# Patient Record
Sex: MALE | Race: WHITE | NOT HISPANIC OR LATINO
[De-identification: names, ages, dates, MRNs, and addresses within clinical notes are randomized per-mention and may not be internally consistent; named-entity substitution may affect disease eponyms.]

---

## 2018-10-12 ENCOUNTER — TRANSCRIPTION ENCOUNTER (OUTPATIENT)
Age: 23
End: 2018-10-12

## 2020-06-28 ENCOUNTER — TRANSCRIPTION ENCOUNTER (OUTPATIENT)
Age: 25
End: 2020-06-28

## 2020-07-05 ENCOUNTER — TRANSCRIPTION ENCOUNTER (OUTPATIENT)
Age: 25
End: 2020-07-05

## 2020-10-29 ENCOUNTER — TRANSCRIPTION ENCOUNTER (OUTPATIENT)
Age: 25
End: 2020-10-29

## 2021-01-18 ENCOUNTER — TRANSCRIPTION ENCOUNTER (OUTPATIENT)
Age: 26
End: 2021-01-18

## 2021-02-10 ENCOUNTER — TRANSCRIPTION ENCOUNTER (OUTPATIENT)
Age: 26
End: 2021-02-10

## 2021-05-17 ENCOUNTER — TRANSCRIPTION ENCOUNTER (OUTPATIENT)
Age: 26
End: 2021-05-17

## 2021-05-21 ENCOUNTER — TRANSCRIPTION ENCOUNTER (OUTPATIENT)
Age: 26
End: 2021-05-21

## 2021-10-05 ENCOUNTER — TRANSCRIPTION ENCOUNTER (OUTPATIENT)
Age: 26
End: 2021-10-05

## 2022-02-03 ENCOUNTER — TRANSCRIPTION ENCOUNTER (OUTPATIENT)
Age: 27
End: 2022-02-03

## 2022-02-16 ENCOUNTER — TRANSCRIPTION ENCOUNTER (OUTPATIENT)
Age: 27
End: 2022-02-16

## 2022-02-24 ENCOUNTER — APPOINTMENT (OUTPATIENT)
Dept: ORTHOPEDIC SURGERY | Facility: CLINIC | Age: 27
End: 2022-02-24
Payer: MEDICAID

## 2022-02-24 ENCOUNTER — NON-APPOINTMENT (OUTPATIENT)
Age: 27
End: 2022-02-24

## 2022-02-24 VITALS
SYSTOLIC BLOOD PRESSURE: 125 MMHG | DIASTOLIC BLOOD PRESSURE: 81 MMHG | HEIGHT: 73 IN | BODY MASS INDEX: 28.49 KG/M2 | WEIGHT: 215 LBS | HEART RATE: 79 BPM

## 2022-02-24 DIAGNOSIS — M76.52 PATELLAR TENDINITIS, LEFT KNEE: ICD-10-CM

## 2022-02-24 PROCEDURE — 99202 OFFICE O/P NEW SF 15 MIN: CPT

## 2022-02-25 DIAGNOSIS — Z82.3 FAMILY HISTORY OF STROKE: ICD-10-CM

## 2022-02-25 DIAGNOSIS — Z72.89 OTHER PROBLEMS RELATED TO LIFESTYLE: ICD-10-CM

## 2022-02-25 DIAGNOSIS — Z82.49 FAMILY HISTORY OF ISCHEMIC HEART DISEASE AND OTHER DISEASES OF THE CIRCULATORY SYSTEM: ICD-10-CM

## 2022-02-25 DIAGNOSIS — F17.200 NICOTINE DEPENDENCE, UNSPECIFIED, UNCOMPLICATED: ICD-10-CM

## 2022-02-25 DIAGNOSIS — Z78.9 OTHER SPECIFIED HEALTH STATUS: ICD-10-CM

## 2022-03-01 PROBLEM — M76.52 PATELLAR TENDINITIS OF LEFT KNEE: Status: ACTIVE | Noted: 2022-02-25

## 2022-03-03 NOTE — DISCUSSION/SUMMARY
[de-identified] : The patient presents with prepatellar bursitis, left knee.  At this time I instructed him on home therapeutic modalities.  Follow up with me on an as needed basis.\par \par

## 2022-03-03 NOTE — ADDENDUM
[FreeTextEntry1] : This note was written by Bety Jensen on 03/03/2022 acting as scribe for Zach Aguillon III, MD

## 2022-03-03 NOTE — PHYSICAL EXAM
[de-identified] : Right knee:\par Knee: Range of Motion in Degrees	\par 	                  Claimant:	Normal:	\par Flexion Active	  135 	                135-degrees	\par Flexion Passive	  135	                135-degrees	\par Extension Active	  0-5	                0-5-degrees	\par Extension Passive	  0-5	                0-5-degrees	\par \par No weakness to flexion/extension.  No evidence of instability in the AP plane or varus or valgus stress.  Negative  Lachman.  Negative pivot shift.  Negative anterior drawer test.  Negative posterior drawer test.  Negative Issac.  Negative Apley grind.  No medial or lateral joint line tenderness.  No tenderness over the medial and lateral facet of the patella.  No patellofemoral crepitations.  No lateral tilting patella.  No patellar apprehension.  No crepitation in the medial and lateral femoral condyle.  No proximal or distal swelling, edema or tenderness.  No gross motor or sensory deficits.  No intra-articular swelling.  2+ DP and PT pulses. No varus or valgus malalignment.  Skin is intact.  No rashes, scars or lesions.  \par  \par Left knee:\par Knee: Range of Motion in Degrees	\par 	                  Claimant:	Normal:	\par Flexion Active	  135 	                135-degrees	\par Flexion Passive	  135	                135-degrees	\par Extension Active	  0-5	                0-5-degrees	\par Extension Passive	  0-5	                0-5-degrees	\par \par Well-healed midline scar.  No weakness to flexion/extension.  Positive prepatellar swelling - small.  No evidence of instability in the AP plane or varus or valgus stress.  Negative  Lachman.  Negative pivot shift.  Negative anterior drawer test.  Negative posterior drawer test.  Negative Issac.  Negative Apley grind.  No medial or lateral joint line tenderness.  No tenderness over the medial and lateral facet of the patella.  No patellofemoral crepitations.  No lateral tilting patella.  No patella apprehension.  No crepitation in the medial and lateral femoral condyle.  No proximal or distal swelling, edema or tenderness.  No gross motor or sensory deficits.  No intra-articular swelling.  2+ DP and PT pulses. No varus or valgus malalignment.  \par   [de-identified] : Gait: Slightly antalgic to antalgic gait.  Station: Normal  [de-identified] : Appearance: Well-developed, well-nourished male  in no acute distress.  [de-identified] : Review of outside radiographs show no obvious osseous abnormality.

## 2022-03-03 NOTE — HISTORY OF PRESENT ILLNESS
[de-identified] : Samuel comes in today for his left knee.  He had a history of having prepatellar bursitis and surgery in the past.  He is in [facility] and on his knees quiet a bit.  He had some swelling.  He has wrapped it and it has gone down.  This injury is not due to an automobile accident.  The patient states the pain is  constant.  The patient describes the pain as sharp, cramping and stabbing.  The patient states rest, heat and ice make the symptoms better while walking and bending makes the symptoms worse.  [8] : a current pain level of 8/10

## 2022-04-11 ENCOUNTER — TRANSCRIPTION ENCOUNTER (OUTPATIENT)
Age: 27
End: 2022-04-11

## 2022-04-16 ENCOUNTER — TRANSCRIPTION ENCOUNTER (OUTPATIENT)
Age: 27
End: 2022-04-16

## 2023-11-11 ENCOUNTER — NON-APPOINTMENT (OUTPATIENT)
Age: 28
End: 2023-11-11

## 2024-07-01 ENCOUNTER — NON-APPOINTMENT (OUTPATIENT)
Age: 29
End: 2024-07-01